# Patient Record
Sex: FEMALE | Race: WHITE | URBAN - METROPOLITAN AREA
[De-identification: names, ages, dates, MRNs, and addresses within clinical notes are randomized per-mention and may not be internally consistent; named-entity substitution may affect disease eponyms.]

---

## 2024-02-24 ENCOUNTER — OFFICE VISIT (OUTPATIENT)
Dept: FAMILY MEDICINE CLINIC | Facility: CLINIC | Age: 24
End: 2024-02-24
Payer: COMMERCIAL

## 2024-02-24 VITALS
HEIGHT: 66 IN | BODY MASS INDEX: 17.36 KG/M2 | OXYGEN SATURATION: 100 % | HEART RATE: 108 BPM | RESPIRATION RATE: 18 BRPM | WEIGHT: 108 LBS | TEMPERATURE: 98 F

## 2024-02-24 DIAGNOSIS — R10.9 ABDOMINAL PAIN, UNSPECIFIED ABDOMINAL LOCATION: Primary | ICD-10-CM

## 2024-02-24 DIAGNOSIS — M25.50 POLYARTHRALGIA: ICD-10-CM

## 2024-02-24 PROCEDURE — 99202 OFFICE O/P NEW SF 15 MIN: CPT | Performed by: PHYSICIAN ASSISTANT

## 2024-02-24 PROCEDURE — 3008F BODY MASS INDEX DOCD: CPT | Performed by: PHYSICIAN ASSISTANT

## 2024-02-24 NOTE — PROGRESS NOTES
Dm Chisholm is a 23 year old female who presents to [facility] with c/o \"intense\" lower abd pain x 1 day, pain woke pt up @ appx 0600 today, currently rated 7/10 and has been gradually worsening throughout the day. (+) nausea without vomiting.  Afebrile.   No prior h/o similar pain in the past.   No prior h/o abd surgeries.   Ongoing joint pain for past year, swelling/pain with ROM.  Has appt next month with PCP for work up.   H/o irregular menses, Patient's last menstrual period was 02/06/2024 (exact date).  Prior h/o pyelonephritis.   Denies urinary symptoms.     Pulse 108   Temp 98.3 °F (36.8 °C)   Resp 18   Ht 5' 6\" (1.676 m)   Wt 108 lb (49 kg)   LMP 02/06/2024 (Exact Date)   SpO2 100%   BMI 17.43 kg/m²   General  Alert, appears uncomfortable, flat affect.   PUlm Normal respirations.   MS  (+) edema/ttp right hand 4th/5th PIP MEDINA, no c/c/e.     Accompanied by: significant other  After triage, higher acuity of care was recommended to Dm Chisholm today.   Rationale: Need for further evaluation and management outside the scope of practice for the walk in clinic  Site recommendation: ED.   Patient/parent verbalized understanding of rationale for further evaluation and was stable upon discharge.  Electronically signed,   Vivienne Guillermo PA-C,  2/24/2024, 12:43 PM

## 2024-03-27 ENCOUNTER — IMAGING SERVICES (OUTPATIENT)
Dept: GENERAL RADIOLOGY | Age: 24
End: 2024-03-27
Attending: INTERNAL MEDICINE

## 2024-03-27 ENCOUNTER — LAB SERVICES (OUTPATIENT)
Dept: LAB | Age: 24
End: 2024-03-27

## 2024-03-27 ENCOUNTER — OFFICE VISIT (OUTPATIENT)
Dept: RHEUMATOLOGY | Age: 24
End: 2024-03-27

## 2024-03-27 VITALS
DIASTOLIC BLOOD PRESSURE: 60 MMHG | SYSTOLIC BLOOD PRESSURE: 110 MMHG | WEIGHT: 115 LBS | OXYGEN SATURATION: 98 % | HEART RATE: 72 BPM

## 2024-03-27 DIAGNOSIS — M06.9 RHEUMATOID ARTHRITIS INVOLVING MULTIPLE SITES, UNSPECIFIED WHETHER RHEUMATOID FACTOR PRESENT (CMD): Primary | ICD-10-CM

## 2024-03-27 DIAGNOSIS — M06.9 RHEUMATOID ARTHRITIS INVOLVING MULTIPLE SITES, UNSPECIFIED WHETHER RHEUMATOID FACTOR PRESENT (CMD): ICD-10-CM

## 2024-03-27 LAB
HBV CORE IGG+IGM SER QL: NEGATIVE
HBV SURFACE AB SER QL: NEGATIVE
HBV SURFACE AG SER QL: NEGATIVE
RHEUMATOID FACT SER NEPH-ACNC: 442 UNITS/ML

## 2024-03-27 PROCEDURE — 73630 X-RAY EXAM OF FOOT: CPT | Performed by: RADIOLOGY

## 2024-03-27 PROCEDURE — 86480 TB TEST CELL IMMUN MEASURE: CPT | Performed by: CLINICAL MEDICAL LABORATORY

## 2024-03-27 PROCEDURE — 36415 COLL VENOUS BLD VENIPUNCTURE: CPT | Performed by: INTERNAL MEDICINE

## 2024-03-27 PROCEDURE — 71046 X-RAY EXAM CHEST 2 VIEWS: CPT | Performed by: RADIOLOGY

## 2024-03-27 PROCEDURE — 73120 X-RAY EXAM OF HAND: CPT | Performed by: RADIOLOGY

## 2024-03-27 PROCEDURE — 99205 OFFICE O/P NEW HI 60 MIN: CPT | Performed by: INTERNAL MEDICINE

## 2024-03-27 PROCEDURE — G2211 COMPLEX E/M VISIT ADD ON: HCPCS | Performed by: INTERNAL MEDICINE

## 2024-03-27 PROCEDURE — 86431 RHEUMATOID FACTOR QUANT: CPT | Performed by: CLINICAL MEDICAL LABORATORY

## 2024-03-27 PROCEDURE — 86704 HEP B CORE ANTIBODY TOTAL: CPT | Performed by: CLINICAL MEDICAL LABORATORY

## 2024-03-27 PROCEDURE — 87340 HEPATITIS B SURFACE AG IA: CPT | Performed by: CLINICAL MEDICAL LABORATORY

## 2024-03-27 PROCEDURE — 86706 HEP B SURFACE ANTIBODY: CPT | Performed by: CLINICAL MEDICAL LABORATORY

## 2024-03-27 RX ORDER — IBUPROFEN 600 MG/1
600 TABLET ORAL EVERY 6 HOURS PRN
COMMUNITY
Start: 2024-02-24

## 2024-03-27 RX ORDER — ELECTROLYTES/DEXTROSE
1 SOLUTION, ORAL ORAL DAILY
COMMUNITY

## 2024-03-27 RX ORDER — ACETAMINOPHEN AND CODEINE PHOSPHATE 300; 30 MG/1; MG/1
1 TABLET ORAL EVERY 4 HOURS PRN
COMMUNITY
Start: 2024-02-25

## 2024-03-27 RX ORDER — ONDANSETRON 4 MG/1
4 TABLET, ORALLY DISINTEGRATING ORAL EVERY 8 HOURS PRN
COMMUNITY
Start: 2024-02-25

## 2024-03-27 RX ORDER — PREDNISONE 5 MG/1
TABLET ORAL
Qty: 62 TABLET | Refills: 0 | Status: SHIPPED | OUTPATIENT
Start: 2024-03-27

## 2024-03-29 ENCOUNTER — E-ADVICE (OUTPATIENT)
Dept: RHEUMATOLOGY | Age: 24
End: 2024-03-29

## 2024-03-29 LAB
GAMMA INTERFERON BACKGROUND BLD IA-ACNC: 0.17 IU/ML
M TB IFN-G BLD-IMP: NEGATIVE
M TB IFN-G CD4+ BCKGRND COR BLD-ACNC: 0 IU/ML
M TB IFN-G CD4+CD8+ BCKGRND COR BLD-ACNC: 0 IU/ML
MITOGEN IGNF BCKGRD COR BLD-ACNC: >10 IU/ML

## 2024-04-12 ENCOUNTER — LAB SERVICES (OUTPATIENT)
Dept: LAB | Age: 24
End: 2024-04-12

## 2024-04-12 ENCOUNTER — E-ADVICE (OUTPATIENT)
Dept: RHEUMATOLOGY | Age: 24
End: 2024-04-12

## 2024-04-12 ENCOUNTER — OFFICE VISIT (OUTPATIENT)
Dept: RHEUMATOLOGY | Age: 24
End: 2024-04-12

## 2024-04-12 VITALS
OXYGEN SATURATION: 100 % | SYSTOLIC BLOOD PRESSURE: 112 MMHG | HEART RATE: 68 BPM | DIASTOLIC BLOOD PRESSURE: 70 MMHG | WEIGHT: 115 LBS

## 2024-04-12 DIAGNOSIS — M05.79 RHEUMATOID ARTHRITIS INVOLVING MULTIPLE SITES WITH POSITIVE RHEUMATOID FACTOR (CMD): Primary | ICD-10-CM

## 2024-04-12 DIAGNOSIS — M05.79 RHEUMATOID ARTHRITIS INVOLVING MULTIPLE SITES WITH POSITIVE RHEUMATOID FACTOR (CMD): ICD-10-CM

## 2024-04-12 LAB
BASOPHILS # BLD: 0 K/MCL (ref 0–0.3)
BASOPHILS NFR BLD: 0 %
DEPRECATED RDW RBC: 42.6 FL (ref 39–50)
EOSINOPHIL # BLD: 0.1 K/MCL (ref 0–0.5)
EOSINOPHIL NFR BLD: 1 %
ERYTHROCYTE [DISTWIDTH] IN BLOOD: 12.8 % (ref 11–15)
HCT VFR BLD CALC: 39.4 % (ref 36–46.5)
HGB BLD-MCNC: 13 G/DL (ref 12–15.5)
IMM GRANULOCYTES # BLD AUTO: 0 K/MCL (ref 0–0.2)
IMM GRANULOCYTES # BLD: 0 %
LYMPHOCYTES # BLD: 1.1 K/MCL (ref 1–4.8)
LYMPHOCYTES NFR BLD: 13 %
MCH RBC QN AUTO: 30.2 PG (ref 26–34)
MCHC RBC AUTO-ENTMCNC: 33 G/DL (ref 32–36.5)
MCV RBC AUTO: 91.6 FL (ref 78–100)
MONOCYTES # BLD: 0.5 K/MCL (ref 0.3–0.9)
MONOCYTES NFR BLD: 5 %
NEUTROPHILS # BLD: 6.8 K/MCL (ref 1.8–7.7)
NEUTROPHILS NFR BLD: 81 %
NRBC BLD MANUAL-RTO: 0 /100 WBC
PLATELET # BLD AUTO: 263 K/MCL (ref 140–450)
RBC # BLD: 4.3 MIL/MCL (ref 4–5.2)
WBC # BLD: 8.5 K/MCL (ref 4.2–11)

## 2024-04-12 RX ORDER — PREDNISONE 5 MG/1
TABLET ORAL
Qty: 60 TABLET | Refills: 0 | Status: SHIPPED | OUTPATIENT
Start: 2024-04-12

## 2024-04-15 LAB — G6PD RBC-CCNT: 12.8 U/G HB (ref 9.9–16.6)

## 2024-04-16 ENCOUNTER — TELEPHONE (OUTPATIENT)
Dept: RHEUMATOLOGY | Age: 24
End: 2024-04-16

## 2024-04-16 DIAGNOSIS — M05.79 RHEUMATOID ARTHRITIS INVOLVING MULTIPLE SITES WITH POSITIVE RHEUMATOID FACTOR (CMD): ICD-10-CM

## 2024-04-16 DIAGNOSIS — Z51.81 MEDICATION MONITORING ENCOUNTER: Primary | ICD-10-CM

## 2024-04-16 RX ORDER — HYDROXYCHLOROQUINE SULFATE 200 MG/1
TABLET, FILM COATED ORAL
Qty: 180 TABLET | Refills: 0 | Status: SHIPPED | OUTPATIENT
Start: 2024-04-16

## 2024-04-24 ENCOUNTER — TELEPHONE (OUTPATIENT)
Dept: RHEUMATOLOGY | Age: 24
End: 2024-04-24

## 2024-05-12 ENCOUNTER — E-ADVICE (OUTPATIENT)
Dept: RHEUMATOLOGY | Age: 24
End: 2024-05-12

## 2024-05-14 ENCOUNTER — E-ADVICE (OUTPATIENT)
Dept: RHEUMATOLOGY | Age: 24
End: 2024-05-14

## 2024-05-14 RX ORDER — PREDNISONE 1 MG/1
TABLET ORAL
Qty: 70 TABLET | Refills: 1 | Status: SHIPPED | OUTPATIENT
Start: 2024-05-14

## 2024-05-14 RX ORDER — PREDNISONE 5 MG/1
TABLET ORAL
Qty: 30 TABLET | Refills: 2 | Status: SHIPPED | OUTPATIENT
Start: 2024-05-14

## 2024-07-17 ENCOUNTER — APPOINTMENT (OUTPATIENT)
Dept: RHEUMATOLOGY | Age: 24
End: 2024-07-17

## 2025-01-26 ENCOUNTER — OFFICE VISIT (OUTPATIENT)
Dept: FAMILY MEDICINE CLINIC | Facility: CLINIC | Age: 25
End: 2025-01-26
Payer: COMMERCIAL

## 2025-01-26 VITALS
HEART RATE: 97 BPM | DIASTOLIC BLOOD PRESSURE: 72 MMHG | WEIGHT: 115 LBS | OXYGEN SATURATION: 97 % | RESPIRATION RATE: 18 BRPM | HEIGHT: 66 IN | BODY MASS INDEX: 18.48 KG/M2 | TEMPERATURE: 100 F | SYSTOLIC BLOOD PRESSURE: 136 MMHG

## 2025-01-26 DIAGNOSIS — A08.4 VIRAL GASTROENTERITIS: Primary | ICD-10-CM

## 2025-01-26 DIAGNOSIS — Z02.89 ENCOUNTER TO OBTAIN EXCUSE FROM SCHOOL: ICD-10-CM

## 2025-01-26 RX ORDER — METHOTREXATE 2.5 MG/1
15 TABLET ORAL WEEKLY
COMMUNITY

## 2025-01-26 RX ORDER — ONDANSETRON 4 MG/1
TABLET, ORALLY DISINTEGRATING ORAL EVERY 12 HOURS PRN
Qty: 10 TABLET | Refills: 0 | Status: SHIPPED | OUTPATIENT
Start: 2025-01-26

## 2025-01-26 RX ORDER — FOLIC ACID 1 MG/1
1 TABLET ORAL DAILY
COMMUNITY
Start: 2024-07-17

## 2025-01-26 RX ORDER — HYDROXYCHLOROQUINE SULFATE 200 MG/1
200 TABLET, FILM COATED ORAL 2 TIMES DAILY
COMMUNITY
Start: 2024-04-16

## 2025-01-26 NOTE — PROGRESS NOTES
CHIEF COMPLAINT:     Chief Complaint   Patient presents with    Vomiting     Diarrhea started this morning          HPI:   Dm Chisholm is a 24 year old female with h/o RA who presents for complaints of nausea and non-bloody,  non-bilious vomiting onset this morning.  2 episodes of emesis this morning.  Has not tried po intake due to fears of n/v.  Urinated this morning. (+) nonbloody watery diarrhea overnight, several episodes.    Low grade fevers at home, felt nausea last night.   Generalized abd pain/lower back pain.  No focal abd pain.    Denies prior h/o GI issues, no surgeries.     Attending [employer] with strict attendance requirements, needs note for school.     Exposed to sibling last week ago who also had similar stomach flu sx.      On Enbrel for RA, no recent dosage increase/changes.  She reports has not taken RA meds in appx 3 weeks due to formulary coverage issues which just recently was resolved and anticipates restarting Enbrel next week.     No recent travel or abx use.       Current Outpatient Medications   Medication Sig Dispense Refill    Etanercept 50 MG/ML Subcutaneous Solution Auto-injector Inject 50 mg into the skin every 7 days.      folic acid 1 MG Oral Tab Take 1 tablet (1 mg total) by mouth daily.      hydroxychloroquine 200 MG Oral Tab Take 1 tablet (200 mg total) by mouth 2 (two) times daily.      methotrexate 2.5 MG Oral Tab Take 6 tablets (15 mg total) by mouth once a week.      ondansetron 4 MG Oral Tablet Dispersible Take 1-2 tablets (4-8 mg total) by mouth every 12 (twelve) hours as needed for Nausea (vomiting). 10 tablet 0      No past medical history on file.   Social History:  Social History     Socioeconomic History    Marital status: Unknown     Social Drivers of Health     Food Insecurity: No Food Insecurity (2/24/2024)    Received from [facility]    Food Insecurity     Currently or in the past 3 months, have you worried your food would run out  before you had money to buy more?: No     In the past 12 months, have you run out of food or been unable to get more?: No   Transportation Needs: No Transportation Needs (2/24/2024)    Received from [facility]    Transportation Needs     Currently or in the past 3 months, has lack of transportation kept you from medical appointments, getting food or medicine, or providing care to a family member?: Unrecognized value     Medical Transportation Needs?: No    [facility]    Housing Stability        REVIEW OF SYSTEMS:   GENERAL HEALTH: (+) chills, low fever, (+) malaise  SKIN: denies any unusual skin lesions or rashes  HEENT: denies ear pain, congestion, or sore throat  CARDIOVASCULAR: denies chest pain or palpitations  RESPIRATORY: denies shortness of breath, cough or wheezing  GI:See HPI  NEURO: denies headaches, loss of bowel or bladder control    EXAM:   /72   Pulse 97   Temp 99.8 °F (37.7 °C)   Resp 18   Ht 5' 6\" (1.676 m)   Wt 115 lb (52.2 kg)   LMP 12/16/2024 (Exact Date)   SpO2 97%   BMI 18.56 kg/m²   GENERAL: well developed, well nourished,in no apparent distress  SKIN: no rashes,no suspicious lesions  HEAD: atraumatic, normocephalic,   EYES: conjunctiva clear, EOM intact  EARS: TM's clear, no bulging, erythema or fluid bilaterally  NOSE: nostrils patent. Nasal mucosa pink and without exudates.   THROAT: oral mucosa pink, moist. Posterior pharynx is not erythematous. No exudates.  NECK: supple,no adenopathy  LUNGS: clear to auscultation bilaterally. No wheezing, rales, or rhonchi.    CARDIO: RRR without murmur  GI: (+)hyperactive BS, soft, ntnd, no masses, no g/r/r, no cvat, neg murphys.   EXTREMITIES: no cyanosis, clubbing or edema    ASSESSMENT AND PLAN:   ASSESSMENT:  Encounter Diagnoses   Name Primary?    Viral gastroenteritis Yes    Encounter to obtain excuse from school        PLAN: Proper diet discussed.   To ED for moderate to severe abdominal pain, dehydration, blood in emesis or stool, or new onset of fever.  Advised to contact rheumatology RE: restarting Enbrel with due to onset c/c.       Reassurance given. Education provided. Questions answered.    Patient Instructions    Zofran 4mg ODT:  1 or 2 tablets every 12 hours for nausea/vomiting.         Follow up with your primary care provider if your symptoms fail to improve and resolve as anticipated    Go to the Immediate Care or Emergency Department in event of new or worsening symptoms at any time       The patient indicates understanding of these issues and agrees to the plan.  The patient is asked to see PCP if no improvement in 2-3 days.    Vivienne Guillermo PA-C, 01/26/25, 9:06 AM

## (undated) NOTE — LETTER
Date: 1/26/2025    Patient Name: Dm Chisholm          To Whom it may concern:    This letter has been written at the patient's request. The above patient was seen at [facility] for treatment of a medical condition.    This patient should be excused from attending work/school from 01/27/25 through 01/30/25.          Sincerely,    Vivienne Guillermo PA-C